# Patient Record
Sex: FEMALE | Race: WHITE | NOT HISPANIC OR LATINO
[De-identification: names, ages, dates, MRNs, and addresses within clinical notes are randomized per-mention and may not be internally consistent; named-entity substitution may affect disease eponyms.]

---

## 2017-01-10 ENCOUNTER — APPOINTMENT (OUTPATIENT)
Dept: OTOLARYNGOLOGY | Facility: CLINIC | Age: 42
End: 2017-01-10

## 2017-01-10 ENCOUNTER — OUTPATIENT (OUTPATIENT)
Dept: OUTPATIENT SERVICES | Facility: HOSPITAL | Age: 42
LOS: 1 days | Discharge: HOME | End: 2017-01-10

## 2017-01-10 VITALS — DIASTOLIC BLOOD PRESSURE: 77 MMHG | HEART RATE: 98 BPM | SYSTOLIC BLOOD PRESSURE: 112 MMHG

## 2017-01-13 DIAGNOSIS — J38.7 OTHER DISEASES OF LARYNX: ICD-10-CM

## 2017-01-13 DIAGNOSIS — E04.1 NONTOXIC SINGLE THYROID NODULE: ICD-10-CM

## 2017-01-26 ENCOUNTER — OUTPATIENT (OUTPATIENT)
Dept: OUTPATIENT SERVICES | Facility: HOSPITAL | Age: 42
LOS: 1 days | End: 2017-01-26
Payer: SELF-PAY

## 2017-01-26 ENCOUNTER — APPOINTMENT (OUTPATIENT)
Dept: NEUROLOGY | Facility: HOSPITAL | Age: 42
End: 2017-01-26

## 2017-01-26 ENCOUNTER — APPOINTMENT (OUTPATIENT)
Dept: DERMATOLOGY | Facility: HOSPITAL | Age: 42
End: 2017-01-26

## 2017-01-26 VITALS
WEIGHT: 121 LBS | RESPIRATION RATE: 14 BRPM | DIASTOLIC BLOOD PRESSURE: 73 MMHG | HEIGHT: 64 IN | HEART RATE: 88 BPM | SYSTOLIC BLOOD PRESSURE: 105 MMHG | BODY MASS INDEX: 20.66 KG/M2

## 2017-01-26 DIAGNOSIS — L98.9 DISORDER OF THE SKIN AND SUBCUTANEOUS TISSUE, UNSPECIFIED: ICD-10-CM

## 2017-01-26 DIAGNOSIS — R51 HEADACHE: ICD-10-CM

## 2017-01-26 DIAGNOSIS — M54.81 OCCIPITAL NEURALGIA: ICD-10-CM

## 2017-01-26 DIAGNOSIS — M33.90 DERMATOPOLYMYOSITIS, UNSPECIFIED, ORGAN INVOLVEMENT UNSPECIFIED: ICD-10-CM

## 2017-01-26 DIAGNOSIS — R56.9 UNSPECIFIED CONVULSIONS: ICD-10-CM

## 2017-01-26 LAB
BASOPHILS # BLD AUTO: 0.02 K/UL — SIGNIFICANT CHANGE UP (ref 0–0.2)
BASOPHILS NFR BLD AUTO: 0.3 % — SIGNIFICANT CHANGE UP (ref 0–2)
EOSINOPHIL # BLD AUTO: 0.08 K/UL — SIGNIFICANT CHANGE UP (ref 0–0.5)
EOSINOPHIL NFR BLD AUTO: 1.3 % — SIGNIFICANT CHANGE UP (ref 0–6)
HCT VFR BLD CALC: 38.9 % — SIGNIFICANT CHANGE UP (ref 34.5–45)
HGB BLD-MCNC: 13 G/DL — SIGNIFICANT CHANGE UP (ref 11.5–15.5)
IMM GRANULOCYTES NFR BLD AUTO: 0.2 % — SIGNIFICANT CHANGE UP (ref 0–1.5)
LYMPHOCYTES # BLD AUTO: 1.58 K/UL — SIGNIFICANT CHANGE UP (ref 1–3.3)
LYMPHOCYTES # BLD AUTO: 25 % — SIGNIFICANT CHANGE UP (ref 13–44)
MCHC RBC-ENTMCNC: 29.7 PG — SIGNIFICANT CHANGE UP (ref 27–34)
MCHC RBC-ENTMCNC: 33.4 GM/DL — SIGNIFICANT CHANGE UP (ref 32–36)
MCV RBC AUTO: 88.8 FL — SIGNIFICANT CHANGE UP (ref 80–100)
MONOCYTES # BLD AUTO: 0.53 K/UL — SIGNIFICANT CHANGE UP (ref 0–0.9)
MONOCYTES NFR BLD AUTO: 8.4 % — SIGNIFICANT CHANGE UP (ref 2–14)
NEUTROPHILS # BLD AUTO: 4.1 K/UL — SIGNIFICANT CHANGE UP (ref 1.8–7.4)
NEUTROPHILS NFR BLD AUTO: 64.8 % — SIGNIFICANT CHANGE UP (ref 43–77)
PLATELET # BLD AUTO: 239 K/UL — SIGNIFICANT CHANGE UP (ref 150–400)
RBC # BLD: 4.38 M/UL — SIGNIFICANT CHANGE UP (ref 3.8–5.2)
RBC # FLD: 12.8 % — SIGNIFICANT CHANGE UP (ref 10.3–14.5)
WBC # BLD: 6.32 K/UL — SIGNIFICANT CHANGE UP (ref 3.8–10.5)
WBC # FLD AUTO: 6.32 K/UL — SIGNIFICANT CHANGE UP (ref 3.8–10.5)

## 2017-01-26 PROCEDURE — 85027 COMPLETE CBC AUTOMATED: CPT

## 2017-01-26 PROCEDURE — 36415 COLL VENOUS BLD VENIPUNCTURE: CPT

## 2017-01-26 PROCEDURE — G0463: CPT

## 2017-02-05 ENCOUNTER — RESULT CHARGE (OUTPATIENT)
Age: 42
End: 2017-02-05

## 2017-02-06 ENCOUNTER — NON-APPOINTMENT (OUTPATIENT)
Age: 42
End: 2017-02-06

## 2017-02-06 ENCOUNTER — APPOINTMENT (OUTPATIENT)
Dept: INTERNAL MEDICINE | Facility: CLINIC | Age: 42
End: 2017-02-06

## 2017-02-06 ENCOUNTER — OUTPATIENT (OUTPATIENT)
Dept: OUTPATIENT SERVICES | Facility: HOSPITAL | Age: 42
LOS: 1 days | End: 2017-02-06
Payer: SELF-PAY

## 2017-02-06 ENCOUNTER — LABORATORY RESULT (OUTPATIENT)
Age: 42
End: 2017-02-06

## 2017-02-06 VITALS
DIASTOLIC BLOOD PRESSURE: 60 MMHG | WEIGHT: 116 LBS | SYSTOLIC BLOOD PRESSURE: 90 MMHG | BODY MASS INDEX: 19.81 KG/M2 | HEIGHT: 64 IN

## 2017-02-06 DIAGNOSIS — N84.0 POLYP OF CORPUS UTERI: ICD-10-CM

## 2017-02-06 DIAGNOSIS — N89.8 OTHER SPECIFIED NONINFLAMMATORY DISORDERS OF VAGINA: ICD-10-CM

## 2017-02-06 DIAGNOSIS — I10 ESSENTIAL (PRIMARY) HYPERTENSION: ICD-10-CM

## 2017-02-06 PROCEDURE — 93005 ELECTROCARDIOGRAM TRACING: CPT

## 2017-02-06 PROCEDURE — 90670 PCV13 VACCINE IM: CPT

## 2017-02-06 PROCEDURE — G0463: CPT

## 2017-02-06 PROCEDURE — G0009: CPT

## 2017-02-07 DIAGNOSIS — Z00.00 ENCOUNTER FOR GENERAL ADULT MEDICAL EXAMINATION WITHOUT ABNORMAL FINDINGS: ICD-10-CM

## 2017-02-07 DIAGNOSIS — R07.9 CHEST PAIN, UNSPECIFIED: ICD-10-CM

## 2017-02-07 DIAGNOSIS — R06.00 DYSPNEA, UNSPECIFIED: ICD-10-CM

## 2017-02-07 DIAGNOSIS — Z23 ENCOUNTER FOR IMMUNIZATION: ICD-10-CM

## 2017-02-07 DIAGNOSIS — I20.8 OTHER FORMS OF ANGINA PECTORIS: ICD-10-CM

## 2017-02-07 LAB
ANION GAP SERPL CALC-SCNC: 15 MMOL/L
BASOPHILS # BLD AUTO: 0.02 K/UL
BASOPHILS NFR BLD AUTO: 0.3 %
BUN SERPL-MCNC: 11 MG/DL
CALCIUM SERPL-MCNC: 9.6 MG/DL
CHLORIDE SERPL-SCNC: 101 MMOL/L
CHOLEST SERPL-MCNC: 162 MG/DL
CHOLEST/HDLC SERPL: 2.9 RATIO
CO2 SERPL-SCNC: 24 MMOL/L
CREAT SERPL-MCNC: 0.83 MG/DL
EOSINOPHIL # BLD AUTO: 0.06 K/UL
EOSINOPHIL NFR BLD AUTO: 0.9 %
GLUCOSE SERPL-MCNC: 81 MG/DL
HCT VFR BLD CALC: 45.6 %
HDLC SERPL-MCNC: 56 MG/DL
HGB BLD-MCNC: 14.9 G/DL
IMM GRANULOCYTES NFR BLD AUTO: 0.2 %
LDLC SERPL CALC-MCNC: 92 MG/DL
LYMPHOCYTES # BLD AUTO: 1.62 K/UL
LYMPHOCYTES NFR BLD AUTO: 24.8 %
MAN DIFF?: NORMAL
MCHC RBC-ENTMCNC: 29.7 PG
MCHC RBC-ENTMCNC: 32.7 GM/DL
MCV RBC AUTO: 90.8 FL
MONOCYTES # BLD AUTO: 0.6 K/UL
MONOCYTES NFR BLD AUTO: 9.2 %
NEUTROPHILS # BLD AUTO: 4.22 K/UL
NEUTROPHILS NFR BLD AUTO: 64.6 %
PLATELET # BLD AUTO: 266 K/UL
POTASSIUM SERPL-SCNC: 4.2 MMOL/L
RBC # BLD: 5.02 M/UL
RBC # FLD: 13.2 %
SODIUM SERPL-SCNC: 140 MMOL/L
TRIGL SERPL-MCNC: 69 MG/DL
WBC # FLD AUTO: 6.53 K/UL

## 2017-02-08 ENCOUNTER — APPOINTMENT (OUTPATIENT)
Dept: CV DIAGNOSTICS | Facility: HOSPITAL | Age: 42
End: 2017-02-08

## 2017-02-08 ENCOUNTER — OUTPATIENT (OUTPATIENT)
Dept: OUTPATIENT SERVICES | Facility: HOSPITAL | Age: 42
LOS: 1 days | End: 2017-02-08
Payer: SELF-PAY

## 2017-02-08 DIAGNOSIS — R06.00 DYSPNEA, UNSPECIFIED: ICD-10-CM

## 2017-02-08 DIAGNOSIS — R07.9 CHEST PAIN, UNSPECIFIED: ICD-10-CM

## 2017-02-08 DIAGNOSIS — I20.8 OTHER FORMS OF ANGINA PECTORIS: ICD-10-CM

## 2017-02-08 DIAGNOSIS — Z23 ENCOUNTER FOR IMMUNIZATION: ICD-10-CM

## 2017-02-08 DIAGNOSIS — Z00.00 ENCOUNTER FOR GENERAL ADULT MEDICAL EXAMINATION WITHOUT ABNORMAL FINDINGS: ICD-10-CM

## 2017-02-08 PROCEDURE — 93018 CV STRESS TEST I&R ONLY: CPT

## 2017-02-08 PROCEDURE — 93016 CV STRESS TEST SUPVJ ONLY: CPT

## 2017-02-08 PROCEDURE — 93017 CV STRESS TEST TRACING ONLY: CPT

## 2017-04-09 ENCOUNTER — FORM ENCOUNTER (OUTPATIENT)
Age: 42
End: 2017-04-09

## 2017-04-10 ENCOUNTER — OUTPATIENT (OUTPATIENT)
Dept: OUTPATIENT SERVICES | Facility: HOSPITAL | Age: 42
LOS: 1 days | End: 2017-04-10
Payer: SELF-PAY

## 2017-04-10 ENCOUNTER — APPOINTMENT (OUTPATIENT)
Dept: ULTRASOUND IMAGING | Facility: IMAGING CENTER | Age: 42
End: 2017-04-10

## 2017-04-10 ENCOUNTER — APPOINTMENT (OUTPATIENT)
Dept: OTOLARYNGOLOGY | Facility: CLINIC | Age: 42
End: 2017-04-10

## 2017-04-10 ENCOUNTER — MESSAGE (OUTPATIENT)
Age: 42
End: 2017-04-10

## 2017-04-10 VITALS
SYSTOLIC BLOOD PRESSURE: 93 MMHG | HEIGHT: 64 IN | RESPIRATION RATE: 18 BRPM | BODY MASS INDEX: 20.49 KG/M2 | HEART RATE: 76 BPM | WEIGHT: 120 LBS | DIASTOLIC BLOOD PRESSURE: 67 MMHG

## 2017-04-10 DIAGNOSIS — Z00.8 ENCOUNTER FOR OTHER GENERAL EXAMINATION: ICD-10-CM

## 2017-04-10 PROCEDURE — 76830 TRANSVAGINAL US NON-OB: CPT

## 2017-04-10 PROCEDURE — 76856 US EXAM PELVIC COMPLETE: CPT

## 2017-04-21 DIAGNOSIS — K21.9 GASTRO-ESOPHAGEAL REFLUX DISEASE WITHOUT ESOPHAGITIS: ICD-10-CM

## 2017-05-04 ENCOUNTER — APPOINTMENT (OUTPATIENT)
Dept: NEUROLOGY | Facility: HOSPITAL | Age: 42
End: 2017-05-04

## 2017-05-04 ENCOUNTER — OUTPATIENT (OUTPATIENT)
Dept: OUTPATIENT SERVICES | Facility: HOSPITAL | Age: 42
LOS: 1 days | End: 2017-05-04
Payer: SELF-PAY

## 2017-05-04 ENCOUNTER — APPOINTMENT (OUTPATIENT)
Dept: OBGYN | Facility: CLINIC | Age: 42
End: 2017-05-04

## 2017-05-04 VITALS
WEIGHT: 121 LBS | BODY MASS INDEX: 20.66 KG/M2 | SYSTOLIC BLOOD PRESSURE: 112 MMHG | HEART RATE: 90 BPM | DIASTOLIC BLOOD PRESSURE: 76 MMHG | HEIGHT: 64 IN

## 2017-05-04 DIAGNOSIS — M54.2 CERVICALGIA: ICD-10-CM

## 2017-05-04 DIAGNOSIS — M54.81 OCCIPITAL NEURALGIA: ICD-10-CM

## 2017-05-04 DIAGNOSIS — R56.9 UNSPECIFIED CONVULSIONS: ICD-10-CM

## 2017-05-04 LAB
CK SERPL-CCNC: 54 U/L — SIGNIFICANT CHANGE UP (ref 25–170)
ERYTHROCYTE [SEDIMENTATION RATE] IN BLOOD: 2 MM/HR — SIGNIFICANT CHANGE UP (ref 0–15)

## 2017-05-04 PROCEDURE — 85652 RBC SED RATE AUTOMATED: CPT

## 2017-05-04 PROCEDURE — G0463: CPT

## 2017-05-04 PROCEDURE — 82550 ASSAY OF CK (CPK): CPT

## 2017-05-04 PROCEDURE — 36415 COLL VENOUS BLD VENIPUNCTURE: CPT

## 2017-05-18 ENCOUNTER — CHART COPY (OUTPATIENT)
Age: 42
End: 2017-05-18

## 2017-05-22 ENCOUNTER — APPOINTMENT (OUTPATIENT)
Dept: OBGYN | Facility: CLINIC | Age: 42
End: 2017-05-22

## 2017-06-05 ENCOUNTER — APPOINTMENT (OUTPATIENT)
Dept: OBGYN | Facility: CLINIC | Age: 42
End: 2017-06-05

## 2017-06-05 ENCOUNTER — OUTPATIENT (OUTPATIENT)
Dept: OUTPATIENT SERVICES | Facility: HOSPITAL | Age: 42
LOS: 1 days | End: 2017-06-05
Payer: SELF-PAY

## 2017-06-05 VITALS
SYSTOLIC BLOOD PRESSURE: 100 MMHG | BODY MASS INDEX: 21.51 KG/M2 | DIASTOLIC BLOOD PRESSURE: 60 MMHG | WEIGHT: 126 LBS | HEIGHT: 64 IN

## 2017-06-05 DIAGNOSIS — M54.81 OCCIPITAL NEURALGIA: ICD-10-CM

## 2017-06-05 DIAGNOSIS — N76.0 ACUTE VAGINITIS: ICD-10-CM

## 2017-06-05 PROCEDURE — G0463: CPT

## 2017-06-05 PROCEDURE — 87086 URINE CULTURE/COLONY COUNT: CPT

## 2017-06-06 ENCOUNTER — LABORATORY RESULT (OUTPATIENT)
Age: 42
End: 2017-06-06

## 2017-06-07 LAB
CULTURE RESULTS: SIGNIFICANT CHANGE UP
SPECIMEN SOURCE: SIGNIFICANT CHANGE UP

## 2017-06-12 DIAGNOSIS — N92.6 IRREGULAR MENSTRUATION, UNSPECIFIED: ICD-10-CM

## 2017-07-14 ENCOUNTER — APPOINTMENT (OUTPATIENT)
Dept: RHEUMATOLOGY | Facility: HOSPITAL | Age: 42
End: 2017-07-14

## 2017-07-14 ENCOUNTER — OUTPATIENT (OUTPATIENT)
Dept: OUTPATIENT SERVICES | Facility: HOSPITAL | Age: 42
LOS: 1 days | End: 2017-07-14
Payer: SELF-PAY

## 2017-07-14 ENCOUNTER — FORM ENCOUNTER (OUTPATIENT)
Age: 42
End: 2017-07-14

## 2017-07-14 VITALS
DIASTOLIC BLOOD PRESSURE: 71 MMHG | WEIGHT: 119 LBS | SYSTOLIC BLOOD PRESSURE: 104 MMHG | HEIGHT: 64 IN | BODY MASS INDEX: 20.32 KG/M2 | HEART RATE: 60 BPM | RESPIRATION RATE: 16 BRPM

## 2017-07-14 DIAGNOSIS — M25.512 PAIN IN LEFT SHOULDER: ICD-10-CM

## 2017-07-14 DIAGNOSIS — M33.90 DERMATOPOLYMYOSITIS, UNSPECIFIED, ORGAN INVOLVEMENT UNSPECIFIED: ICD-10-CM

## 2017-07-14 DIAGNOSIS — M06.9 RHEUMATOID ARTHRITIS, UNSPECIFIED: ICD-10-CM

## 2017-07-14 LAB
ALBUMIN SERPL ELPH-MCNC: 4.4 G/DL — SIGNIFICANT CHANGE UP (ref 3.3–5)
ALP SERPL-CCNC: 45 U/L — SIGNIFICANT CHANGE UP (ref 40–120)
ALT FLD-CCNC: 13 U/L — SIGNIFICANT CHANGE UP (ref 10–45)
ANION GAP SERPL CALC-SCNC: 13 MMOL/L — SIGNIFICANT CHANGE UP (ref 5–17)
AST SERPL-CCNC: 21 U/L — SIGNIFICANT CHANGE UP (ref 10–40)
BASOPHILS # BLD AUTO: 0.02 K/UL — SIGNIFICANT CHANGE UP (ref 0–0.2)
BASOPHILS NFR BLD AUTO: 0.3 % — SIGNIFICANT CHANGE UP (ref 0–2)
BILIRUB SERPL-MCNC: 1.4 MG/DL — HIGH (ref 0.2–1.2)
BUN SERPL-MCNC: 10 MG/DL — SIGNIFICANT CHANGE UP (ref 7–23)
CALCIUM SERPL-MCNC: 9.3 MG/DL — SIGNIFICANT CHANGE UP (ref 8.4–10.5)
CHLORIDE SERPL-SCNC: 100 MMOL/L — SIGNIFICANT CHANGE UP (ref 96–108)
CK SERPL-CCNC: 89 U/L — SIGNIFICANT CHANGE UP (ref 25–170)
CO2 SERPL-SCNC: 25 MMOL/L — SIGNIFICANT CHANGE UP (ref 22–31)
CREAT SERPL-MCNC: 0.7 MG/DL — SIGNIFICANT CHANGE UP (ref 0.5–1.3)
CRP SERPL-MCNC: <0.2 MG/DL — SIGNIFICANT CHANGE UP (ref 0–0.4)
EOSINOPHIL # BLD AUTO: 0.08 K/UL — SIGNIFICANT CHANGE UP (ref 0–0.5)
EOSINOPHIL NFR BLD AUTO: 1.3 % — SIGNIFICANT CHANGE UP (ref 0–6)
ERYTHROCYTE [SEDIMENTATION RATE] IN BLOOD: 2 MM/HR — SIGNIFICANT CHANGE UP (ref 0–15)
GLUCOSE SERPL-MCNC: 92 MG/DL — SIGNIFICANT CHANGE UP (ref 70–99)
HCT VFR BLD CALC: 41.5 % — SIGNIFICANT CHANGE UP (ref 34.5–45)
HGB BLD-MCNC: 14.4 G/DL — SIGNIFICANT CHANGE UP (ref 11.5–15.5)
IMM GRANULOCYTES NFR BLD AUTO: 0.2 % — SIGNIFICANT CHANGE UP (ref 0–1.5)
LYMPHOCYTES # BLD AUTO: 1.6 K/UL — SIGNIFICANT CHANGE UP (ref 1–3.3)
LYMPHOCYTES # BLD AUTO: 27 % — SIGNIFICANT CHANGE UP (ref 13–44)
MCHC RBC-ENTMCNC: 30.2 PG — SIGNIFICANT CHANGE UP (ref 27–34)
MCHC RBC-ENTMCNC: 34.7 GM/DL — SIGNIFICANT CHANGE UP (ref 32–36)
MCV RBC AUTO: 87 FL — SIGNIFICANT CHANGE UP (ref 80–100)
MONOCYTES # BLD AUTO: 0.49 K/UL — SIGNIFICANT CHANGE UP (ref 0–0.9)
MONOCYTES NFR BLD AUTO: 8.3 % — SIGNIFICANT CHANGE UP (ref 2–14)
NEUTROPHILS # BLD AUTO: 3.73 K/UL — SIGNIFICANT CHANGE UP (ref 1.8–7.4)
NEUTROPHILS NFR BLD AUTO: 62.9 % — SIGNIFICANT CHANGE UP (ref 43–77)
PLATELET # BLD AUTO: 247 K/UL — SIGNIFICANT CHANGE UP (ref 150–400)
POTASSIUM SERPL-MCNC: 5.2 MMOL/L — SIGNIFICANT CHANGE UP (ref 3.5–5.3)
POTASSIUM SERPL-SCNC: 5.2 MMOL/L — SIGNIFICANT CHANGE UP (ref 3.5–5.3)
PROT SERPL-MCNC: 8 G/DL — SIGNIFICANT CHANGE UP (ref 6–8.3)
RBC # BLD: 4.77 M/UL — SIGNIFICANT CHANGE UP (ref 3.8–5.2)
RBC # FLD: 12.7 % — SIGNIFICANT CHANGE UP (ref 10.3–14.5)
SODIUM SERPL-SCNC: 138 MMOL/L — SIGNIFICANT CHANGE UP (ref 135–145)
WBC # BLD: 5.93 K/UL — SIGNIFICANT CHANGE UP (ref 3.8–10.5)
WBC # FLD AUTO: 5.93 K/UL — SIGNIFICANT CHANGE UP (ref 3.8–10.5)

## 2017-07-14 PROCEDURE — 85652 RBC SED RATE AUTOMATED: CPT

## 2017-07-14 PROCEDURE — 86140 C-REACTIVE PROTEIN: CPT

## 2017-07-14 PROCEDURE — 82550 ASSAY OF CK (CPK): CPT

## 2017-07-14 PROCEDURE — 82085 ASSAY OF ALDOLASE: CPT

## 2017-07-14 PROCEDURE — 36415 COLL VENOUS BLD VENIPUNCTURE: CPT

## 2017-07-14 PROCEDURE — G0463: CPT

## 2017-07-14 PROCEDURE — 80053 COMPREHEN METABOLIC PANEL: CPT

## 2017-07-14 PROCEDURE — 85027 COMPLETE CBC AUTOMATED: CPT

## 2017-07-14 PROCEDURE — 86900 BLOOD TYPING SEROLOGIC ABO: CPT

## 2017-07-14 RX ORDER — TIZANIDINE 2 MG/1
2 TABLET ORAL
Qty: 14 | Refills: 0 | Status: DISCONTINUED | COMMUNITY
Start: 2017-05-04 | End: 2017-07-14

## 2017-07-15 ENCOUNTER — APPOINTMENT (OUTPATIENT)
Dept: ULTRASOUND IMAGING | Facility: IMAGING CENTER | Age: 42
End: 2017-07-15

## 2017-07-15 ENCOUNTER — OUTPATIENT (OUTPATIENT)
Dept: OUTPATIENT SERVICES | Facility: HOSPITAL | Age: 42
LOS: 1 days | End: 2017-07-15
Payer: SELF-PAY

## 2017-07-15 DIAGNOSIS — E04.1 NONTOXIC SINGLE THYROID NODULE: ICD-10-CM

## 2017-07-15 PROCEDURE — 76536 US EXAM OF HEAD AND NECK: CPT

## 2017-07-17 LAB — ALDOLASE SERPL-CCNC: 8.5 U/L — SIGNIFICANT CHANGE UP (ref 3.3–10.3)

## 2017-07-28 ENCOUNTER — OTHER (OUTPATIENT)
Age: 42
End: 2017-07-28

## 2017-08-20 ENCOUNTER — FORM ENCOUNTER (OUTPATIENT)
Age: 42
End: 2017-08-20

## 2017-08-21 ENCOUNTER — APPOINTMENT (OUTPATIENT)
Dept: ULTRASOUND IMAGING | Facility: IMAGING CENTER | Age: 42
End: 2017-08-21
Payer: COMMERCIAL

## 2017-08-21 ENCOUNTER — APPOINTMENT (OUTPATIENT)
Dept: OTOLARYNGOLOGY | Facility: CLINIC | Age: 42
End: 2017-08-21
Payer: COMMERCIAL

## 2017-08-21 ENCOUNTER — APPOINTMENT (OUTPATIENT)
Dept: MAMMOGRAPHY | Facility: IMAGING CENTER | Age: 42
End: 2017-08-21
Payer: COMMERCIAL

## 2017-08-21 ENCOUNTER — OUTPATIENT (OUTPATIENT)
Dept: OUTPATIENT SERVICES | Facility: HOSPITAL | Age: 42
LOS: 1 days | Discharge: HOME | End: 2017-08-21

## 2017-08-21 ENCOUNTER — OUTPATIENT (OUTPATIENT)
Dept: OUTPATIENT SERVICES | Facility: HOSPITAL | Age: 42
LOS: 1 days | End: 2017-08-21
Payer: SELF-PAY

## 2017-08-21 VITALS — DIASTOLIC BLOOD PRESSURE: 74 MMHG | HEART RATE: 72 BPM | SYSTOLIC BLOOD PRESSURE: 118 MMHG

## 2017-08-21 DIAGNOSIS — N92.6 IRREGULAR MENSTRUATION, UNSPECIFIED: ICD-10-CM

## 2017-08-21 DIAGNOSIS — M33.90 DERMATOPOLYMYOSITIS, UNSPECIFIED, ORGAN INVOLVEMENT UNSPECIFIED: ICD-10-CM

## 2017-08-21 DIAGNOSIS — R92.2 INCONCLUSIVE MAMMOGRAM: ICD-10-CM

## 2017-08-21 DIAGNOSIS — M25.512 PAIN IN LEFT SHOULDER: ICD-10-CM

## 2017-08-21 PROCEDURE — 99214 OFFICE O/P EST MOD 30 MIN: CPT | Mod: 25

## 2017-08-21 PROCEDURE — 31575 DIAGNOSTIC LARYNGOSCOPY: CPT

## 2017-08-21 PROCEDURE — G0202: CPT | Mod: 26

## 2017-08-21 PROCEDURE — 77063 BREAST TOMOSYNTHESIS BI: CPT | Mod: 26

## 2017-08-21 PROCEDURE — 86900 BLOOD TYPING SEROLOGIC ABO: CPT

## 2017-08-21 PROCEDURE — 76641 ULTRASOUND BREAST COMPLETE: CPT | Mod: 26,50

## 2017-08-21 PROCEDURE — 77063 BREAST TOMOSYNTHESIS BI: CPT

## 2017-08-21 PROCEDURE — 76641 ULTRASOUND BREAST COMPLETE: CPT

## 2017-08-21 PROCEDURE — 77067 SCR MAMMO BI INCL CAD: CPT

## 2017-08-24 DIAGNOSIS — K21.9 GASTRO-ESOPHAGEAL REFLUX DISEASE WITHOUT ESOPHAGITIS: ICD-10-CM

## 2017-08-24 DIAGNOSIS — E04.1 NONTOXIC SINGLE THYROID NODULE: ICD-10-CM

## 2017-08-25 ENCOUNTER — RX RENEWAL (OUTPATIENT)
Age: 42
End: 2017-08-25

## 2017-08-25 RX ORDER — CHOLECALCIFEROL (VITAMIN D3) 50 MCG
50 MCG CAPSULE ORAL
Qty: 30 | Refills: 3 | Status: ACTIVE | COMMUNITY
Start: 2017-08-25 | End: 1900-01-01

## 2017-09-14 ENCOUNTER — OUTPATIENT (OUTPATIENT)
Dept: OUTPATIENT SERVICES | Facility: HOSPITAL | Age: 42
LOS: 1 days | End: 2017-09-14
Payer: SELF-PAY

## 2017-09-14 ENCOUNTER — LABORATORY RESULT (OUTPATIENT)
Age: 42
End: 2017-09-14

## 2017-09-14 ENCOUNTER — RESULT REVIEW (OUTPATIENT)
Age: 42
End: 2017-09-14

## 2017-09-14 ENCOUNTER — APPOINTMENT (OUTPATIENT)
Dept: OBGYN | Facility: CLINIC | Age: 42
End: 2017-09-14
Payer: COMMERCIAL

## 2017-09-14 VITALS — WEIGHT: 124 LBS | SYSTOLIC BLOOD PRESSURE: 100 MMHG | DIASTOLIC BLOOD PRESSURE: 58 MMHG | BODY MASS INDEX: 21.28 KG/M2

## 2017-09-14 DIAGNOSIS — N76.0 ACUTE VAGINITIS: ICD-10-CM

## 2017-09-14 PROCEDURE — 87624 HPV HI-RISK TYP POOLED RSLT: CPT

## 2017-09-14 PROCEDURE — 88175 CYTOPATH C/V AUTO FLUID REDO: CPT

## 2017-09-14 PROCEDURE — G0463: CPT

## 2017-09-14 PROCEDURE — 99396 PREV VISIT EST AGE 40-64: CPT | Mod: NC

## 2017-09-15 LAB
C TRACH RRNA SPEC QL NAA+PROBE: SIGNIFICANT CHANGE UP
HPV HIGH+LOW RISK DNA PNL CVX: SIGNIFICANT CHANGE UP
N GONORRHOEA RRNA SPEC QL NAA+PROBE: SIGNIFICANT CHANGE UP
SPECIMEN SOURCE: SIGNIFICANT CHANGE UP

## 2017-09-18 LAB — CYTOLOGY SPEC DOC CYTO: SIGNIFICANT CHANGE UP

## 2017-09-21 DIAGNOSIS — Z01.419 ENCOUNTER FOR GYNECOLOGICAL EXAMINATION (GENERAL) (ROUTINE) WITHOUT ABNORMAL FINDINGS: ICD-10-CM

## 2017-09-25 ENCOUNTER — OUTPATIENT (OUTPATIENT)
Dept: OUTPATIENT SERVICES | Facility: HOSPITAL | Age: 42
LOS: 1 days | End: 2017-09-25
Payer: SELF-PAY

## 2017-09-25 DIAGNOSIS — Z23 ENCOUNTER FOR IMMUNIZATION: ICD-10-CM

## 2017-09-25 DIAGNOSIS — Z00.00 ENCOUNTER FOR GENERAL ADULT MEDICAL EXAMINATION WITHOUT ABNORMAL FINDINGS: ICD-10-CM

## 2017-09-25 DIAGNOSIS — I20.8 OTHER FORMS OF ANGINA PECTORIS: ICD-10-CM

## 2017-09-25 DIAGNOSIS — R06.00 DYSPNEA, UNSPECIFIED: ICD-10-CM

## 2017-09-25 DIAGNOSIS — R07.9 CHEST PAIN, UNSPECIFIED: ICD-10-CM

## 2017-09-25 PROCEDURE — G0008: CPT

## 2017-09-25 PROCEDURE — 90656 IIV3 VACC NO PRSV 0.5 ML IM: CPT

## 2017-09-26 ENCOUNTER — MED ADMIN CHARGE (OUTPATIENT)
Age: 42
End: 2017-09-26

## 2017-09-26 ENCOUNTER — APPOINTMENT (OUTPATIENT)
Dept: INTERNAL MEDICINE | Facility: CLINIC | Age: 42
End: 2017-09-26

## 2017-10-11 ENCOUNTER — OUTPATIENT (OUTPATIENT)
Dept: OUTPATIENT SERVICES | Facility: HOSPITAL | Age: 42
LOS: 1 days | End: 2017-10-11
Payer: SELF-PAY

## 2017-10-11 ENCOUNTER — APPOINTMENT (OUTPATIENT)
Dept: INTERNAL MEDICINE | Facility: CLINIC | Age: 42
End: 2017-10-11

## 2017-10-11 VITALS
DIASTOLIC BLOOD PRESSURE: 70 MMHG | HEIGHT: 64 IN | HEART RATE: 82 BPM | SYSTOLIC BLOOD PRESSURE: 110 MMHG | OXYGEN SATURATION: 98 %

## 2017-10-11 DIAGNOSIS — Z09 ENCOUNTER FOR FOLLOW-UP EXAMINATION AFTER COMPLETED TREATMENT FOR CONDITIONS OTHER THAN MALIGNANT NEOPLASM: ICD-10-CM

## 2017-10-11 DIAGNOSIS — N89.8 OTHER SPECIFIED NONINFLAMMATORY DISORDERS OF VAGINA: ICD-10-CM

## 2017-10-11 DIAGNOSIS — Z87.42 PERSONAL HISTORY OF OTHER DISEASES OF THE FEMALE GENITAL TRACT: ICD-10-CM

## 2017-10-11 DIAGNOSIS — Z87.39 PERSONAL HISTORY OF OTHER DISEASES OF THE MUSCULOSKELETAL SYSTEM AND CONNECTIVE TISSUE: ICD-10-CM

## 2017-10-11 DIAGNOSIS — J02.9 ACUTE PHARYNGITIS, UNSPECIFIED: ICD-10-CM

## 2017-10-11 DIAGNOSIS — J30.1 ALLERGIC RHINITIS DUE TO POLLEN: ICD-10-CM

## 2017-10-11 DIAGNOSIS — E04.1 NONTOXIC SINGLE THYROID NODULE: ICD-10-CM

## 2017-10-11 DIAGNOSIS — N76.0 ACUTE VAGINITIS: ICD-10-CM

## 2017-10-11 DIAGNOSIS — R92.2 INCONCLUSIVE MAMMOGRAM: ICD-10-CM

## 2017-10-11 DIAGNOSIS — Z01.818 ENCOUNTER FOR OTHER PREPROCEDURAL EXAMINATION: ICD-10-CM

## 2017-10-11 DIAGNOSIS — N84.0 POLYP OF CORPUS UTERI: ICD-10-CM

## 2017-10-11 DIAGNOSIS — Z87.898 PERSONAL HISTORY OF OTHER SPECIFIED CONDITIONS: ICD-10-CM

## 2017-10-11 DIAGNOSIS — B37.9 CANDIDIASIS, UNSPECIFIED: ICD-10-CM

## 2017-10-11 DIAGNOSIS — B96.89 ACUTE VAGINITIS: ICD-10-CM

## 2017-10-11 DIAGNOSIS — Z87.19 PERSONAL HISTORY OF OTHER DISEASES OF THE DIGESTIVE SYSTEM: ICD-10-CM

## 2017-10-11 DIAGNOSIS — R59.0 LOCALIZED ENLARGED LYMPH NODES: ICD-10-CM

## 2017-10-11 DIAGNOSIS — Z48.02 ENCOUNTER FOR REMOVAL OF SUTURES: ICD-10-CM

## 2017-10-11 DIAGNOSIS — R10.2 PELVIC AND PERINEAL PAIN: ICD-10-CM

## 2017-10-11 DIAGNOSIS — Z01.419 ENCOUNTER FOR GYNECOLOGICAL EXAMINATION (GENERAL) (ROUTINE) W/OUT ABNORMAL FINDINGS: ICD-10-CM

## 2017-10-11 DIAGNOSIS — I20.8 OTHER FORMS OF ANGINA PECTORIS: ICD-10-CM

## 2017-10-11 DIAGNOSIS — I10 ESSENTIAL (PRIMARY) HYPERTENSION: ICD-10-CM

## 2017-10-11 DIAGNOSIS — Z98.890 OTHER SPECIFIED POSTPROCEDURAL STATES: ICD-10-CM

## 2017-10-11 DIAGNOSIS — R50.9 FEVER, UNSPECIFIED: ICD-10-CM
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
## 2017-10-11 DIAGNOSIS — S81.851D OPEN BITE, RIGHT LOWER LEG, SUBSEQUENT ENCOUNTER: ICD-10-CM

## 2017-10-11 DIAGNOSIS — Z00.00 ENCOUNTER FOR GENERAL ADULT MEDICAL EXAMINATION W/OUT ABNORMAL FINDINGS: ICD-10-CM

## 2017-10-11 DIAGNOSIS — K21.9 GASTRO-ESOPHAGEAL REFLUX DISEASE W/OUT ESOPHAGITIS: ICD-10-CM

## 2017-10-11 DIAGNOSIS — Z31.69 ENCOUNTER FOR OTHER GENERAL COUNSELING AND ADVICE ON PROCREATION: ICD-10-CM

## 2017-10-11 PROCEDURE — G0463: CPT

## 2017-10-13 DIAGNOSIS — M33.90 DERMATOPOLYMYOSITIS, UNSPECIFIED, ORGAN INVOLVEMENT UNSPECIFIED: ICD-10-CM

## 2017-10-13 DIAGNOSIS — E04.1 NONTOXIC SINGLE THYROID NODULE: ICD-10-CM

## 2017-10-13 DIAGNOSIS — N84.0 POLYP OF CORPUS UTERI: ICD-10-CM

## 2017-10-13 NOTE — REVIEW OF SYSTEMS
[Heartburn] : heartburn [Fever] : no fever [Chills] : no chills [Eye Pain] : no eye pain [Red Eyes] : eyes not red [Earache] : no earache [Loss Of Hearing] : no hearing loss [Heart Rate Is Slow] : the heart rate was not slow [Heart Rate Is Fast] : the heart rate was not fast [Chest Pain] : no chest pain [Lower Ext Edema] : no lower extremity edema [Shortness Of Breath] : no shortness of breath [Wheezing] : no wheezing [Orthopnea] : no orthopnea [Abdominal Pain] : no abdominal pain [Vomiting] : no vomiting [Constipation] : no constipation [Diarrhea] : no diarrhea [Dysuria] : no dysuria [Incontinence] : no incontinence [Arthralgias] : no arthralgias [Joint Pain] : no joint pain [Skin Lesions] : no skin lesions [Skin Wound] : no skin wound [Confused] : no confusion [Convulsions] : no convulsions [Suicidal] : not suicidal [Sleep Disturbances] : no sleep disturbances [Proptosis] : no proptosis [Hot Flashes] : no hot flashes [Easy Bleeding] : no tendency for easy bleeding [Easy Bruising] : no tendency for easy bruising

## 2017-10-13 NOTE — HISTORY OF PRESENT ILLNESS
[FreeTextEntry1] : 41 yo F w/ Hx of dermatomyositis, thyroid nodules present in clinic for CPE. Patient reported feeling generally well recently. Had cold like sx and diarrhea around 2 weeks ago. Patient continue to have mild coughing at present. Otherwise, patient denied sob, chest pain, headache, diarrhea/constipation. Patient reported recently followed up with ENT, rheumatology services: her thyroid nodule is stable in size at this time and planned for repeat annual US next summer; her dermatomyositis is currently under control with Plaquenil.

## 2017-10-13 NOTE — ASSESSMENT
[FreeTextEntry1] : 43 yo F w/ Hx of dermatomyositis, thyroid nodules present in clinic for CPE.\par \par Cough: Residual sx from recent cold; likely secondary to PND.\par - Patient already using saline goggling. \par - Continue to monitor residual sx.\par - No fever, chills, n/v or other concering signs at this time.\par \par Thyroid nodule: Currently asymptomatic. \par - C/w ENT follow up. \par - Repeat US next summer per ENT.\par \par Dermatomyositis: Patient reported no sx at this time. Dx since 2014 and tolerating treatment well.\par - Recently check CK in July 2017 and results wnl.\par - Patient f/u with rheumatology recently and plan for c/w low dose Plaquenil. \par \par HCM: \par - Lab works recently checked and results wnl including CBC, CMP.\par - UTD with Tdap and flu shots.\par - Mammogram 08/17 BIRADS 1. F/u in one year.\par -  Pap 09/2017 results negative. Next pap in 2022. \par \par \par

## 2017-10-13 NOTE — PHYSICAL EXAM
[General Appearance - Alert] : alert [General Appearance - In No Acute Distress] : in no acute distress [Sclera] : the sclera and conjunctiva were normal [PERRL With Normal Accommodation] : pupils were equal in size, round, and reactive to light [Extraocular Movements] : extraocular movements were intact [Strabismus] : no strabismus was seen [Neck Appearance] : the appearance of the neck was normal [Neck Cervical Mass (___cm)] : no neck mass was observed [] : no respiratory distress [Respiration, Rhythm And Depth] : normal respiratory rhythm and effort [Exaggerated Use Of Accessory Muscles For Inspiration] : no accessory muscle use [Auscultation Breath Sounds / Voice Sounds] : lungs were clear to auscultation bilaterally [Breast Appearance] : normal in appearance [Breast Palpation Mass] : no palpable masses [Bowel Sounds] : normal bowel sounds [Abdomen Soft] : soft [Abdomen Tenderness] : non-tender [No CVA Tenderness] : no ~M costovertebral angle tenderness [No Spinal Tenderness] : no spinal tenderness [Abnormal Walk] : normal gait [Musculoskeletal - Swelling] : no joint swelling seen [Motor Tone] : muscle strength and tone were normal [Cranial Nerves] : cranial nerves 2-12 were intact [Deep Tendon Reflexes (DTR)] : deep tendon reflexes were 2+ and symmetric [Sensation] : the sensory exam was normal to light touch and pinprick [Motor Exam] : the motor exam was normal [Oriented To Time, Place, And Person] : oriented to person, place, and time [Impaired Insight] : insight and judgment were intact [Affect] : the affect was normal [Mood] : the mood was normal [FreeTextEntry1] : Unable to palpate thyroid nodules

## 2017-10-16 ENCOUNTER — LABORATORY RESULT (OUTPATIENT)
Age: 42
End: 2017-10-16

## 2017-10-17 DIAGNOSIS — R30.0 DYSURIA: ICD-10-CM

## 2017-10-19 ENCOUNTER — OUTPATIENT (OUTPATIENT)
Dept: OUTPATIENT SERVICES | Facility: HOSPITAL | Age: 42
LOS: 1 days | End: 2017-10-19
Payer: SELF-PAY

## 2017-10-19 ENCOUNTER — APPOINTMENT (OUTPATIENT)
Dept: OBGYN | Facility: CLINIC | Age: 42
End: 2017-10-19
Payer: COMMERCIAL

## 2017-10-19 VITALS — DIASTOLIC BLOOD PRESSURE: 60 MMHG | SYSTOLIC BLOOD PRESSURE: 108 MMHG | WEIGHT: 123 LBS | BODY MASS INDEX: 21.11 KG/M2

## 2017-10-19 DIAGNOSIS — N63.0 UNSPECIFIED LUMP IN UNSPECIFIED BREAST: ICD-10-CM

## 2017-10-19 DIAGNOSIS — N76.0 ACUTE VAGINITIS: ICD-10-CM

## 2017-10-19 PROCEDURE — G0463: CPT

## 2017-10-19 PROCEDURE — 99213 OFFICE O/P EST LOW 20 MIN: CPT | Mod: NC

## 2017-10-19 PROCEDURE — 81003 URINALYSIS AUTO W/O SCOPE: CPT

## 2017-10-20 LAB
APPEARANCE UR: CLEAR — SIGNIFICANT CHANGE UP
BILIRUB UR-MCNC: NEGATIVE — SIGNIFICANT CHANGE UP
COLOR SPEC: YELLOW — SIGNIFICANT CHANGE UP
DIFF PNL FLD: NEGATIVE — SIGNIFICANT CHANGE UP
GLUCOSE UR QL: NEGATIVE MG/DL — SIGNIFICANT CHANGE UP
KETONES UR-MCNC: NEGATIVE — SIGNIFICANT CHANGE UP
LEUKOCYTE ESTERASE UR-ACNC: NEGATIVE — SIGNIFICANT CHANGE UP
NITRITE UR-MCNC: NEGATIVE — SIGNIFICANT CHANGE UP
PH UR: 6 — SIGNIFICANT CHANGE UP (ref 5–8)
PROT UR-MCNC: NEGATIVE MG/DL — SIGNIFICANT CHANGE UP
SP GR SPEC: 1.02 — SIGNIFICANT CHANGE UP (ref 1.01–1.02)
UROBILINOGEN FLD QL: NEGATIVE MG/DL — SIGNIFICANT CHANGE UP

## 2017-10-25 DIAGNOSIS — N64.4 MASTODYNIA: ICD-10-CM

## 2017-11-06 ENCOUNTER — RX RENEWAL (OUTPATIENT)
Age: 42
End: 2017-11-06

## 2017-11-07 ENCOUNTER — MEDICATION RENEWAL (OUTPATIENT)
Age: 42
End: 2017-11-07

## 2017-11-10 ENCOUNTER — APPOINTMENT (OUTPATIENT)
Dept: OPHTHALMOLOGY | Facility: CLINIC | Age: 42
End: 2017-11-10

## 2017-12-12 ENCOUNTER — APPOINTMENT (OUTPATIENT)
Dept: OTOLARYNGOLOGY | Facility: CLINIC | Age: 42
End: 2017-12-12
Payer: COMMERCIAL

## 2017-12-12 VITALS
HEART RATE: 91 BPM | HEIGHT: 64 IN | SYSTOLIC BLOOD PRESSURE: 137 MMHG | BODY MASS INDEX: 21 KG/M2 | WEIGHT: 123 LBS | DIASTOLIC BLOOD PRESSURE: 90 MMHG

## 2017-12-12 DIAGNOSIS — E04.1 NONTOXIC SINGLE THYROID NODULE: ICD-10-CM

## 2017-12-12 DIAGNOSIS — K21.9 GASTRO-ESOPHAGEAL REFLUX DISEASE W/OUT ESOPHAGITIS: ICD-10-CM

## 2017-12-12 PROCEDURE — 31575 DIAGNOSTIC LARYNGOSCOPY: CPT

## 2017-12-12 PROCEDURE — 99214 OFFICE O/P EST MOD 30 MIN: CPT | Mod: 25

## 2017-12-20 ENCOUNTER — OTHER (OUTPATIENT)
Age: 42
End: 2017-12-20

## 2018-01-08 PROBLEM — K21.9 LPRD (LARYNGOPHARYNGEAL REFLUX DISEASE): Status: ACTIVE | Noted: 2018-01-08

## 2018-02-06 ENCOUNTER — FORM ENCOUNTER (OUTPATIENT)
Age: 43
End: 2018-02-06

## 2018-02-07 ENCOUNTER — APPOINTMENT (OUTPATIENT)
Dept: ULTRASOUND IMAGING | Facility: IMAGING CENTER | Age: 43
End: 2018-02-07
Payer: COMMERCIAL

## 2018-02-07 ENCOUNTER — OUTPATIENT (OUTPATIENT)
Dept: OUTPATIENT SERVICES | Facility: HOSPITAL | Age: 43
LOS: 1 days | End: 2018-02-07
Payer: SELF-PAY

## 2018-02-07 ENCOUNTER — OUTPATIENT (OUTPATIENT)
Dept: OUTPATIENT SERVICES | Facility: HOSPITAL | Age: 43
LOS: 1 days | End: 2018-02-07

## 2018-02-07 ENCOUNTER — APPOINTMENT (OUTPATIENT)
Dept: SURGICAL ONCOLOGY | Facility: CLINIC | Age: 43
End: 2018-02-07
Payer: SELF-PAY

## 2018-02-07 VITALS
WEIGHT: 128 LBS | SYSTOLIC BLOOD PRESSURE: 96 MMHG | OXYGEN SATURATION: 100 % | BODY MASS INDEX: 21.85 KG/M2 | HEART RATE: 93 BPM | DIASTOLIC BLOOD PRESSURE: 63 MMHG | HEIGHT: 64 IN

## 2018-02-07 DIAGNOSIS — N64.4 MASTODYNIA: ICD-10-CM

## 2018-02-07 PROCEDURE — 76642 ULTRASOUND BREAST LIMITED: CPT

## 2018-02-07 PROCEDURE — 99244 OFF/OP CNSLTJ NEW/EST MOD 40: CPT

## 2018-02-07 PROCEDURE — 76642 ULTRASOUND BREAST LIMITED: CPT | Mod: 26,RT

## 2018-06-27 ENCOUNTER — FORM ENCOUNTER (OUTPATIENT)
Age: 43
End: 2018-06-27

## 2018-06-28 ENCOUNTER — OUTPATIENT (OUTPATIENT)
Dept: OUTPATIENT SERVICES | Facility: HOSPITAL | Age: 43
LOS: 1 days | End: 2018-06-28
Payer: SELF-PAY

## 2018-06-28 ENCOUNTER — APPOINTMENT (OUTPATIENT)
Dept: ULTRASOUND IMAGING | Facility: IMAGING CENTER | Age: 43
End: 2018-06-28
Payer: COMMERCIAL

## 2018-06-28 DIAGNOSIS — E04.1 NONTOXIC SINGLE THYROID NODULE: ICD-10-CM

## 2018-06-28 PROCEDURE — 76536 US EXAM OF HEAD AND NECK: CPT

## 2018-06-28 PROCEDURE — 76536 US EXAM OF HEAD AND NECK: CPT | Mod: 26

## 2018-08-09 ENCOUNTER — APPOINTMENT (OUTPATIENT)
Dept: DERMATOLOGY | Facility: HOSPITAL | Age: 43
End: 2018-08-09

## 2018-08-09 ENCOUNTER — OUTPATIENT (OUTPATIENT)
Dept: OUTPATIENT SERVICES | Facility: HOSPITAL | Age: 43
LOS: 1 days | End: 2018-08-09
Payer: SELF-PAY

## 2018-08-09 VITALS
BODY MASS INDEX: 22.71 KG/M2 | SYSTOLIC BLOOD PRESSURE: 102 MMHG | DIASTOLIC BLOOD PRESSURE: 69 MMHG | HEIGHT: 64 IN | RESPIRATION RATE: 14 BRPM | HEART RATE: 65 BPM | WEIGHT: 133 LBS

## 2018-08-09 DIAGNOSIS — L98.9 DISORDER OF THE SKIN AND SUBCUTANEOUS TISSUE, UNSPECIFIED: ICD-10-CM

## 2018-08-09 DIAGNOSIS — M33.90 DERMATOPOLYMYOSITIS, UNSPECIFIED, ORGAN INVOLVEMENT UNSPECIFIED: ICD-10-CM

## 2018-08-09 PROCEDURE — G0463: CPT

## 2018-08-09 RX ORDER — OMEPRAZOLE 20 MG/1
20 CAPSULE, DELAYED RELEASE ORAL DAILY
Qty: 30 | Refills: 5 | Status: DISCONTINUED | COMMUNITY
Start: 2017-08-21 | End: 2018-08-09

## 2018-08-09 RX ORDER — OMEPRAZOLE 40 MG/1
40 CAPSULE, DELAYED RELEASE ORAL
Qty: 30 | Refills: 5 | Status: DISCONTINUED | COMMUNITY
Start: 2017-01-13 | End: 2018-08-09

## 2018-08-10 ENCOUNTER — APPOINTMENT (OUTPATIENT)
Dept: RHEUMATOLOGY | Facility: HOSPITAL | Age: 43
End: 2018-08-10

## 2018-08-10 DIAGNOSIS — M33.90 DERMATOPOLYMYOSITIS, UNSPECIFIED, ORGAN INVOLVEMENT UNSPECIFIED: ICD-10-CM

## 2019-09-02 PROBLEM — Z09 FOLLOW UP: Status: RESOLVED | Noted: 2017-04-10 | Resolved: 2019-09-02

## 2019-11-24 ENCOUNTER — TRANSCRIPTION ENCOUNTER (OUTPATIENT)
Age: 44
End: 2019-11-24

## 2021-07-22 ENCOUNTER — TRANSCRIPTION ENCOUNTER (OUTPATIENT)
Age: 46
End: 2021-07-22

## 2024-04-25 ENCOUNTER — APPOINTMENT (OUTPATIENT)
Dept: GASTROENTEROLOGY | Facility: CLINIC | Age: 49
End: 2024-04-25

## 2024-06-23 RX ORDER — OMEPRAZOLE 40 MG/1
40 CAPSULE, DELAYED RELEASE ORAL
Qty: 30 | Refills: 1 | Status: ACTIVE | COMMUNITY
Start: 2024-02-15 | End: 1900-01-01

## 2025-03-08 ENCOUNTER — NON-APPOINTMENT (OUTPATIENT)
Age: 50
End: 2025-03-08